# Patient Record
Sex: MALE | Race: WHITE | Employment: FULL TIME | URBAN - NONMETROPOLITAN AREA
[De-identification: names, ages, dates, MRNs, and addresses within clinical notes are randomized per-mention and may not be internally consistent; named-entity substitution may affect disease eponyms.]

---

## 2021-08-05 ENCOUNTER — HOSPITAL ENCOUNTER (EMERGENCY)
Age: 26
Discharge: HOME | End: 2021-08-05
Attending: EMERGENCY MEDICINE

## 2021-08-05 VITALS
TEMPERATURE: 98.6 F | DIASTOLIC BLOOD PRESSURE: 75 MMHG | HEART RATE: 104 BPM | OXYGEN SATURATION: 98 % | BODY MASS INDEX: 23 KG/M2 | WEIGHT: 169.8 LBS | HEIGHT: 72 IN | RESPIRATION RATE: 20 BRPM | SYSTOLIC BLOOD PRESSURE: 135 MMHG

## 2021-08-05 DIAGNOSIS — Z20.2 STD EXPOSURE: Primary | ICD-10-CM

## 2021-08-05 PROCEDURE — 87591 N.GONORRHOEAE DNA AMP PROB: CPT

## 2021-08-05 PROCEDURE — 99283 EMERGENCY DEPT VISIT LOW MDM: CPT

## 2021-08-05 PROCEDURE — 87491 CHLMYD TRACH DNA AMP PROBE: CPT

## 2021-08-05 RX ORDER — AZITHROMYCIN 250 MG/1
1000 TABLET, FILM COATED ORAL ONCE
Qty: 4 TABLET | Refills: 0 | Status: SHIPPED | OUTPATIENT
Start: 2021-08-05 | End: 2021-08-05

## 2021-08-05 NOTE — ED PROVIDER NOTES
eMERGENCY dEPARTMENT eNCOUnter        [facility]    Chief Complaint   Patient presents with    Exposure to STD     pt presents to the ED stating his girlfriend has been diagnosed with chlamyidia and he would like to be treated for it.  states he has been urinating frequently. HPI    Pilo Oshea is a 27 y.o. male who presents to ED from home. By car. With complaint of frequent urination. Patient's girlfriend was diagnosed with chlamydia. Onset x 2 days. Patient denies urethral discharge. Patient denies burning with urination. Patient states he has frequent urination. Patient denies fever or back pain. REVIEW OF SYSTEMS    All systems reviewed and positives are in the HPI      PAST MEDICAL HISTORY    No past medical history on file. SURGICAL HISTORY    No past surgical history on file. CURRENT MEDICATIONS        ALLERGIES    No Known Allergies    FAMILY HISTORY    No family history on file. SOCIAL HISTORY    Social History     Socioeconomic History    Marital status: Single     Spouse name: Not on file    Number of children: Not on file    Years of education: Not on file    Highest education level: Not on file   Occupational History    Not on file   Tobacco Use    Smoking status: Not on file   Substance and Sexual Activity    Alcohol use: Not on file    Drug use: Not on file    Sexual activity: Not on file   Other Topics Concern    Not on file   Social History Narrative    Not on file     Social Determinants of Health     Financial Resource Strain:     Difficulty of Paying Living Expenses:    Food Insecurity:     Worried About Running Out of Food in the Last Year:     [address] in the Last Year:    Transportation Needs:     Lack of Transportation (Medical):      Lack of Transportation (Non-Medical):    Physical Activity:     Days of Exercise per Week:     Minutes of Exercise per Session:    Stress:     Feeling of Stress :    Social Connections:     Frequency of Communication with Friends and Family:     Frequency of Social Gatherings with Friends and Family:     Attends Congregational Services:     Active Member of Clubs or Organizations:     Attends Club or Organization Meetings:     Marital Status:    Intimate Partner Violence:     Fear of Current or Ex-Partner:     Emotionally Abused:     Physically Abused:     Sexually Abused:        PHYSICAL EXAM    VITAL SIGNS: /75   Pulse 104   Temp 98.6 °F (37 °C)   Resp 20   Ht 6' (1.829 m)   Wt 169 lb 12.8 oz (77 kg)   SpO2 98%   BMI 23.03 kg/m²   Constitutional:  Well developed, well nourished, no acute distress, non-toxic appearance   Eyes:  PERRL, conjunctiva normal   HENT:  Atraumatic, external ears normal, nose normal, oropharynx moist.  Neck- supple   Respiratory:  No respiratory distress, normal breath sounds   Cardiovascular:  Normal rate, normal rhythm, no murmurs, no gallops, no rubs   GI: Frequent urination. : No CVA tenderness to percussion. Patient has frequent urination. No burning. Musculoskeletal:  No edema, no tenderness   Integument:  Well hydrated     RADIOLOGY  No orders to display         [address] [place] [address] DNA, URINE           Summation      Patient Course: Urinalysis sent out for [address] and chlamydia. Patient will be sent sent home on azithromycin. For new symptoms return to ED. For positive urinalysis results patient will be notified. ED Medications administered this visit:  Medications - No data to display    New Prescriptions from this visit:    New Prescriptions    AZITHROMYCIN (ZITHROMAX) 250 MG TABLET    Take 4 tablets by mouth once for 1 dose       Follow-up:  No follow-up provider specified. Final Impression:   1.  STD exposure               (Please note that portions of this note were completed with a voice recognition program.  Efforts were made to edit the dictations but occasionally words are mis-transcribed.)

## 2021-08-06 LAB
C. TRACHOMATIS DNA ,URINE: ABNORMAL
N. GONORRHOEAE DNA, URINE: NEGATIVE
SPECIMEN DESCRIPTION: ABNORMAL

## 2023-01-17 ENCOUNTER — HOSPITAL ENCOUNTER (OUTPATIENT)
Age: 28
Setting detail: SPECIMEN
Discharge: HOME | End: 2023-01-17
Payer: MEDICAID

## 2023-01-17 ENCOUNTER — OFFICE VISIT (OUTPATIENT)
Dept: PRIMARY CARE CLINIC | Age: 28
End: 2023-01-17
Payer: MEDICAID

## 2023-01-17 VITALS
OXYGEN SATURATION: 100 % | HEART RATE: 80 BPM | HEIGHT: 72 IN | RESPIRATION RATE: 18 BRPM | SYSTOLIC BLOOD PRESSURE: 136 MMHG | DIASTOLIC BLOOD PRESSURE: 81 MMHG | BODY MASS INDEX: 25.06 KG/M2 | TEMPERATURE: 98.3 F | WEIGHT: 185 LBS

## 2023-01-17 DIAGNOSIS — Z11.3 SCREEN FOR STD (SEXUALLY TRANSMITTED DISEASE): ICD-10-CM

## 2023-01-17 DIAGNOSIS — N34.2 URETHRITIS: Primary | ICD-10-CM

## 2023-01-17 DIAGNOSIS — B36.0 TINEA VERSICOLOR: ICD-10-CM

## 2023-01-17 DIAGNOSIS — Z20.2 EXPOSURE TO CHLAMYDIA: ICD-10-CM

## 2023-01-17 PROBLEM — T50.901A POISONING BY UNSPECIFIED DRUGS, MEDICAMENTS AND BIOLOGICAL SUBSTANCES, ACCIDENTAL (UNINTENTIONAL), INITIAL ENCOUNTER: Status: ACTIVE | Noted: 2022-07-15

## 2023-01-17 PROBLEM — R56.9 UNSPECIFIED CONVULSIONS (HCC): Status: ACTIVE | Noted: 2022-07-15

## 2023-01-17 LAB
BILIRUBIN, POC: NEGATIVE
BLOOD URINE, POC: NEGATIVE
CLARITY, POC: ABNORMAL
COLOR, POC: YELLOW
GLUCOSE URINE, POC: NEGATIVE
KETONES, POC: NEGATIVE
LEUKOCYTE EST, POC: NEGATIVE
NITRITE, POC: NEGATIVE
PH, POC: 7
PROTEIN, POC: NEGATIVE
SPECIFIC GRAVITY, POC: 1.02
UROBILINOGEN, POC: 0.2

## 2023-01-17 PROCEDURE — 87491 CHLMYD TRACH DNA AMP PROBE: CPT

## 2023-01-17 PROCEDURE — 87591 N.GONORRHOEAE DNA AMP PROB: CPT

## 2023-01-17 PROCEDURE — 99202 OFFICE O/P NEW SF 15 MIN: CPT | Performed by: NURSE PRACTITIONER

## 2023-01-17 PROCEDURE — 81002 URINALYSIS NONAUTO W/O SCOPE: CPT | Performed by: NURSE PRACTITIONER

## 2023-01-17 RX ORDER — DOXYCYCLINE 100 MG/1
100 CAPSULE ORAL 2 TIMES DAILY
Qty: 14 CAPSULE | Refills: 0 | Status: SHIPPED | OUTPATIENT
Start: 2023-01-17 | End: 2023-01-24

## 2023-01-17 NOTE — PROGRESS NOTES
Chief Complaint:   Exposure to STD (Girlfriend has Chlamydia./Started a couple months ago-Pain with urination, burning with urination. )      History of Present Illness   Source of history provided by:  patientEunice Encinas is a 24 y.o. old male who has a past medical history of: No past medical history on file. presents to the walk in clinic for concerns of possible STD exposure. Since exposure/onset the symptoms have been progressing and consist of dysuria. Pt denies any fever, chills, abdominal pain, back pain, pain with erection, or lethargy. Abdoul complains of occasional left-sided chest tenderness in which he feels is \"I have a man boobs\". He denies any trauma, swelling, nipple discharge, discoloration. He denies chest pain and describes it as \"almost like muscle pain\". Abdoul complains of an ongoing rash that he has noted to his mid chest in which he reports he has googled it and is concerned for tinea versicolor. He has tried numerous over-the-counter creams in which some do seem to improve but symptoms always return. There is been no itching, new lotions or creams. No new animals. ROS     Past Surgical History:  has no past surgical history on file. Social History:    Family History: family history is not on file. Allergies: Penicillins    Physical Exam       VS:  /81 (Site: Left Upper Arm, Position: Sitting, Cuff Size: Medium Adult)   Pulse 80   Temp 98.3 °F (36.8 °C) (Oral)   Resp 18   Ht 6' (1.829 m)   Wt 185 lb (83.9 kg)   SpO2 100%   BMI 25.09 kg/m²        Constitutional:  Alert, development consistent with age. Neck:  Normal ROM. Supple. Lungs:  Clear to auscultation and breath sounds equal.  Heart:  Regular rate and rhythm, normal heart sounds, without pathological murmurs, ectopy, gallops, or rubs.   Chest/breast: Inspection reveals a bilateral breast enlargement, symmetrical.  Palpation to bilateral breast/chest without any reports of pain; no nodules, glandular tissue, unusual warmth. Nipples are symmetrical without active drainage. No lymphadenopathy. Abdomen:  Soft, nontender, good bowel sounds. Genitalia Exam: Declined. Skin:  Normal turgor. Warm, dry, with multiple mildly erythematous, thin plaques to upper chest and trunk. Neurological:  Orientation age-appropriate. Motor functions intact. Lab / Imaging Results   (All laboratory and radiology results have been personally reviewed by myself)  Labs:  No results found for this visit on 01/17/23. Assessment / Plan   Impression(s):  Jose Luis Rivera was seen today for exposure to std. Diagnoses and all orders for this visit:    Urethritis  -     POCT Urinalysis no Micro  -     doxycycline monohydrate (MONODOX) 100 MG capsule; Take 1 capsule by mouth 2 times daily for 7 days    Tinea versicolor    Exposure to chlamydia  -     Chlamydia/GC DNA, Urine; Future  -     doxycycline monohydrate (MONODOX) 100 MG capsule; Take 1 capsule by mouth 2 times daily for 7 days    Screen for STD (sexually transmitted disease)  -     Chlamydia/GC DNA, Urine; Future  -     HIV Screen; Future  -     RPR; Future  -     Hepatitis Panel, Acute; Future        - UA came back wnl in office. Urine also sent for GC/CT testing today. Lab requisitions for out patient testing provided including RPR, hepatitis panel, and HIV, will call with results once available. Due known exposure to chlamydia, will begin doxycycline today; administration/side effects/probiotics discussed. Advised to avoid sexual contact for at least 1 week after treatment to prevent spread. - Rash over chest appears to be tinea versicolor, advised and recommended over-the-counter topical Selsun Blue.  -Chest/breast exam within normal limits. Encourage follow-up either at this office or PCP for any further concerns. Recommended strict follow-up to ER for any chest pain.        Alfred Brady, APRN - CNP

## 2023-01-17 NOTE — LETTER
[facility] [address]  Phone: [phone]  Fax: [address] [facility],  [address], APRN - CNP        January 17, 2023     Patient: Azar Harper   YOB: 1995   Date of Visit: 1/17/2023       To Whom It May Concern: It is my medical opinion that Azar Harper may return to work on 01/18/2023. If you have any questions or concerns, please don't hesitate to call.     Sincerely,        Diana Connors, APRN - CNP

## 2023-01-17 NOTE — PATIENT INSTRUCTIONS
SURVEY:    You may be receiving a survey from [employer] regarding your visit today. Please complete the survey to enable us to provide the highest quality of care to you and your family. If you cannot score us a very good on any question, please call the office to discuss how we could of made your experience a very good one. Thank you for letting us take care of you today. We hope all your questions were addressed. If a question was overlooked or something else comes to mind after you return home, please contact a member of your Care Team listed below.     Thank you,  Viktoria Kennedy MA      Your Care Team at [facility]  Provider- SUZANNE Hartman  Provider- SUZANNE Whittaker  [address] [address]  Reception- Alfreda Dwyer      Walk-in contact numbers:       Phone: [phone]                 Fax: [phone]    M[facility] Br[facility] Hours:  Mon-Thurs: 9:00 am - 5:30 pm     Friday: 8:00 am - 12:00 pm           Sat-Sun: CLOSED

## 2023-01-18 LAB
C. TRACHOMATIS DNA ,URINE: NEGATIVE
N. GONORRHOEAE DNA, URINE: NEGATIVE
SPECIMEN DESCRIPTION: NORMAL